# Patient Record
Sex: MALE | URBAN - METROPOLITAN AREA
[De-identification: names, ages, dates, MRNs, and addresses within clinical notes are randomized per-mention and may not be internally consistent; named-entity substitution may affect disease eponyms.]

---

## 2018-05-14 ENCOUNTER — TELEPHONE (OUTPATIENT)
Dept: INTERNAL MEDICINE | Facility: CLINIC | Age: 28
End: 2018-05-14

## 2018-05-14 NOTE — TELEPHONE ENCOUNTER
----- Message from Adela Velasquez sent at 5/14/2018  1:12 PM EDT -----  Contact: Pt  Pt called to cancel appt with MD.  Has not been seen since 2014.  With MD retiring I didn't know if we can reschedule when he calls back or not.      Pt# Phone:  H:[phone]